# Patient Record
Sex: FEMALE | Race: OTHER | Employment: PART TIME | URBAN - METROPOLITAN AREA
[De-identification: names, ages, dates, MRNs, and addresses within clinical notes are randomized per-mention and may not be internally consistent; named-entity substitution may affect disease eponyms.]

---

## 2017-03-17 PROCEDURE — 88305 TISSUE EXAM BY PATHOLOGIST: CPT | Performed by: RADIOLOGY

## 2018-03-28 PROCEDURE — 82746 ASSAY OF FOLIC ACID SERUM: CPT | Performed by: INTERNAL MEDICINE

## 2018-03-28 PROCEDURE — 82607 VITAMIN B-12: CPT | Performed by: INTERNAL MEDICINE

## 2018-07-17 PROCEDURE — 86200 CCP ANTIBODY: CPT | Performed by: INTERNAL MEDICINE

## 2018-08-08 PROCEDURE — 87510 GARDNER VAG DNA DIR PROBE: CPT | Performed by: INTERNAL MEDICINE

## 2018-08-08 PROCEDURE — 87480 CANDIDA DNA DIR PROBE: CPT | Performed by: INTERNAL MEDICINE

## 2018-08-08 PROCEDURE — 87660 TRICHOMONAS VAGIN DIR PROBE: CPT | Performed by: INTERNAL MEDICINE

## 2018-10-19 PROCEDURE — 87102 FUNGUS ISOLATION CULTURE: CPT | Performed by: OBSTETRICS & GYNECOLOGY

## 2018-10-19 PROCEDURE — 87106 FUNGI IDENTIFICATION YEAST: CPT | Performed by: OBSTETRICS & GYNECOLOGY

## 2018-11-03 ENCOUNTER — TELEPHONE (OUTPATIENT)
Dept: OBGYN UNIT | Facility: HOSPITAL | Age: 43
End: 2018-11-03

## 2018-11-03 NOTE — TELEPHONE ENCOUNTER
Patient called complaining of yeast symptoms. Has complicated history of recurrent yeast. Has been seen by several physicians, including Dr. Eddie Tidwell last week.   Discussed with patient that she should wait until Monday to discuss with Dr. Eddie Tidwell the next st

## 2018-11-07 PROCEDURE — 87660 TRICHOMONAS VAGIN DIR PROBE: CPT | Performed by: INTERNAL MEDICINE

## 2018-11-07 PROCEDURE — 87102 FUNGUS ISOLATION CULTURE: CPT | Performed by: INTERNAL MEDICINE

## 2018-11-07 PROCEDURE — 87147 CULTURE TYPE IMMUNOLOGIC: CPT | Performed by: INTERNAL MEDICINE

## 2018-11-07 PROCEDURE — 87186 SC STD MICRODIL/AGAR DIL: CPT | Performed by: INTERNAL MEDICINE

## 2018-11-07 PROCEDURE — 87106 FUNGI IDENTIFICATION YEAST: CPT | Performed by: INTERNAL MEDICINE

## 2018-11-07 PROCEDURE — 87109 MYCOPLASMA: CPT | Performed by: INTERNAL MEDICINE

## 2018-11-07 PROCEDURE — 87205 SMEAR GRAM STAIN: CPT | Performed by: INTERNAL MEDICINE

## 2018-11-07 PROCEDURE — 87070 CULTURE OTHR SPECIMN AEROBIC: CPT | Performed by: INTERNAL MEDICINE

## 2018-11-07 PROCEDURE — 87591 N.GONORRHOEAE DNA AMP PROB: CPT | Performed by: INTERNAL MEDICINE

## 2018-11-07 PROCEDURE — 87510 GARDNER VAG DNA DIR PROBE: CPT | Performed by: INTERNAL MEDICINE

## 2018-11-07 PROCEDURE — 87206 SMEAR FLUORESCENT/ACID STAI: CPT | Performed by: INTERNAL MEDICINE

## 2018-11-07 PROCEDURE — 87389 HIV-1 AG W/HIV-1&-2 AB AG IA: CPT | Performed by: INTERNAL MEDICINE

## 2018-11-07 PROCEDURE — 87480 CANDIDA DNA DIR PROBE: CPT | Performed by: INTERNAL MEDICINE

## 2018-11-07 PROCEDURE — 86780 TREPONEMA PALLIDUM: CPT | Performed by: INTERNAL MEDICINE

## 2018-11-07 PROCEDURE — 87491 CHLMYD TRACH DNA AMP PROBE: CPT | Performed by: INTERNAL MEDICINE

## 2018-11-07 PROCEDURE — 36415 COLL VENOUS BLD VENIPUNCTURE: CPT | Performed by: INTERNAL MEDICINE

## 2018-11-07 PROCEDURE — 87077 CULTURE AEROBIC IDENTIFY: CPT | Performed by: INTERNAL MEDICINE

## 2018-12-05 PROCEDURE — 88175 CYTOPATH C/V AUTO FLUID REDO: CPT | Performed by: OBSTETRICS & GYNECOLOGY

## 2018-12-05 PROCEDURE — 87624 HPV HI-RISK TYP POOLED RSLT: CPT | Performed by: OBSTETRICS & GYNECOLOGY

## 2018-12-10 PROCEDURE — 87070 CULTURE OTHR SPECIMN AEROBIC: CPT | Performed by: OBSTETRICS & GYNECOLOGY

## 2018-12-10 PROCEDURE — 87075 CULTR BACTERIA EXCEPT BLOOD: CPT | Performed by: OBSTETRICS & GYNECOLOGY

## 2018-12-10 PROCEDURE — 87205 SMEAR GRAM STAIN: CPT | Performed by: OBSTETRICS & GYNECOLOGY

## 2019-01-23 PROCEDURE — 87102 FUNGUS ISOLATION CULTURE: CPT | Performed by: OBSTETRICS & GYNECOLOGY

## 2019-06-11 PROBLEM — B37.3 VAGINAL YEAST INFECTION: Status: ACTIVE | Noted: 2018-08-03
